# Patient Record
Sex: FEMALE | Race: WHITE | NOT HISPANIC OR LATINO | Employment: OTHER | URBAN - METROPOLITAN AREA
[De-identification: names, ages, dates, MRNs, and addresses within clinical notes are randomized per-mention and may not be internally consistent; named-entity substitution may affect disease eponyms.]

---

## 2017-07-20 ENCOUNTER — HOSPITAL ENCOUNTER (EMERGENCY)
Facility: HOSPITAL | Age: 57
Discharge: HOME | End: 2017-07-20
Attending: EMERGENCY MEDICINE | Admitting: EMERGENCY MEDICINE

## 2017-07-20 ENCOUNTER — APPOINTMENT (OUTPATIENT)
Dept: GENERAL RADIOLOGY | Facility: HOSPITAL | Age: 57
End: 2017-07-20

## 2017-07-20 ENCOUNTER — APPOINTMENT (OUTPATIENT)
Dept: MRI IMAGING | Facility: HOSPITAL | Age: 57
End: 2017-07-20

## 2017-07-20 VITALS
OXYGEN SATURATION: 96 % | HEART RATE: 85 BPM | RESPIRATION RATE: 18 BRPM | WEIGHT: 149 LBS | TEMPERATURE: 98.1 F | BODY MASS INDEX: 22.58 KG/M2 | DIASTOLIC BLOOD PRESSURE: 78 MMHG | HEIGHT: 68 IN | SYSTOLIC BLOOD PRESSURE: 125 MMHG

## 2017-07-20 DIAGNOSIS — S80.02XA CONTUSION OF LEFT KNEE, INITIAL ENCOUNTER: Primary | ICD-10-CM

## 2017-07-20 DIAGNOSIS — S33.5XXA LUMBAR SPRAIN, INITIAL ENCOUNTER: ICD-10-CM

## 2017-07-20 DIAGNOSIS — M54.50 ACUTE BILATERAL LOW BACK PAIN WITHOUT SCIATICA: ICD-10-CM

## 2017-07-20 PROCEDURE — 96372 THER/PROPH/DIAG INJ SC/IM: CPT

## 2017-07-20 PROCEDURE — 25010000002 KETOROLAC TROMETHAMINE PER 15 MG: Performed by: PHYSICIAN ASSISTANT

## 2017-07-20 PROCEDURE — 99284 EMERGENCY DEPT VISIT MOD MDM: CPT

## 2017-07-20 PROCEDURE — 73560 X-RAY EXAM OF KNEE 1 OR 2: CPT

## 2017-07-20 PROCEDURE — 72148 MRI LUMBAR SPINE W/O DYE: CPT

## 2017-07-20 RX ORDER — OXYCODONE AND ACETAMINOPHEN 7.5; 325 MG/1; MG/1
1 TABLET ORAL ONCE
Status: COMPLETED | OUTPATIENT
Start: 2017-07-20 | End: 2017-07-20

## 2017-07-20 RX ORDER — ONDANSETRON 4 MG/1
4 TABLET, ORALLY DISINTEGRATING ORAL ONCE
Status: COMPLETED | OUTPATIENT
Start: 2017-07-20 | End: 2017-07-20

## 2017-07-20 RX ORDER — KETOROLAC TROMETHAMINE 30 MG/ML
30 INJECTION, SOLUTION INTRAMUSCULAR; INTRAVENOUS ONCE
Status: COMPLETED | OUTPATIENT
Start: 2017-07-20 | End: 2017-07-20

## 2017-07-20 RX ORDER — HYDROCODONE BITARTRATE AND ACETAMINOPHEN 5; 325 MG/1; MG/1
1 TABLET ORAL EVERY 6 HOURS PRN
Qty: 15 TABLET | Refills: 0 | Status: SHIPPED | OUTPATIENT
Start: 2017-07-20

## 2017-07-20 RX ORDER — CYCLOBENZAPRINE HCL 10 MG
10 TABLET ORAL 3 TIMES DAILY PRN
Qty: 15 TABLET | Refills: 0 | Status: SHIPPED | OUTPATIENT
Start: 2017-07-20

## 2017-07-20 RX ADMIN — OXYCODONE HYDROCHLORIDE AND ACETAMINOPHEN 1 TABLET: 7.5; 325 TABLET ORAL at 20:09

## 2017-07-20 RX ADMIN — ONDANSETRON 4 MG: 4 TABLET, ORALLY DISINTEGRATING ORAL at 20:30

## 2017-07-20 RX ADMIN — KETOROLAC TROMETHAMINE 30 MG: 30 INJECTION, SOLUTION INTRAMUSCULAR at 22:47

## 2017-07-20 NOTE — ED PROVIDER NOTES
Subjective   HPI Comments: Ms. Brisa Rivera is a 56 y.o. female who presents to the ED with c/o a fall. She reports that last yesterday evening her knee gave out as she was going up some steps in front of her house and she fell backwards, landing on the concrete sidewalk. She now complains of back pain that radiates into her groin, neck pain, and left knee pain. She also reports that she had an episode of bowel incontinence 4 hours ago and has not urinated all day. She has a hx of right knee replacement. No other acute complaints at this time.      Patient is a 56 y.o. female presenting with fall.   History provided by:  Patient  Fall   Mechanism of injury: fall    Injury location:  Torso, head/neck and leg  Head/neck injury location:  L neck and R neck  Torso injury location:  Back  Leg injury location:  L knee  Time since incident:  1 day  Arrived directly from scene: no    Fall:     Impact surface:  [place]    Entrapped after fall: no    Protective equipment: none    Prior to arrival data:     Patient ambulatory at scene: yes      Loss of consciousness: no    Associated symptoms: back pain and neck pain    Associated symptoms: no difficulty breathing, no loss of consciousness and no vomiting        Review of Systems   Constitutional: Negative for chills and fever.   Gastrointestinal: Negative for vomiting.   Musculoskeletal: Positive for back pain and neck pain.   Neurological: Negative for loss of consciousness and syncope.   All other systems reviewed and are negative.      Past Medical History:   Diagnosis Date   • Bipolar 1 disorder    • GERD (gastroesophageal reflux disease)    • Myocardial infarction    • Pneumonia        Allergies   Allergen Reactions   • Contrast Dye    • Demerol [Meperidine]    • Morphine And Related        Past Surgical History:   Procedure Laterality Date   • APPENDECTOMY     • HERNIA REPAIR     • HYSTERECTOMY     • KNEE SURGERY     • TONSILLECTOMY         History reviewed. No  pertinent family history.    Social History     Social History   • Marital status:      Spouse name: N/A   • Number of children: N/A   • Years of education: N/A     Social History Main Topics   • Smoking status: Never Smoker   • Smokeless tobacco: None   • Alcohol use No   • Drug use: No   • Sexual activity: Defer     Other Topics Concern   • None     Social History Narrative   • None         Objective   Physical Exam   Constitutional: She is oriented to person, place, and time. She appears well-developed and well-nourished. No distress.   HENT:   Head: Normocephalic and atraumatic.   Mouth/Throat: Oropharynx is clear and moist and mucous membranes are normal.   Eyes: Pupils are equal, round, and reactive to light.   Neck: Normal range of motion.   Cardiovascular: Normal rate, regular rhythm and normal heart sounds.    No murmur heard.  Pulmonary/Chest: Effort normal and breath sounds normal. No respiratory distress. She has no wheezes. She has no rales.   Abdominal: Soft. Bowel sounds are normal. There is no tenderness.   Genitourinary: Rectal exam shows anal tone normal.   Musculoskeletal: Normal range of motion. She exhibits tenderness. She exhibits no edema.   Patient is tender to light palpation of her left knee and left medial lower leg. No obvious crepitus or step off with normal ROM. Her back is TTP to light palpitation.   Neurological: She is alert and oriented to person, place, and time.   Skin: Skin is warm and dry. Bruising (Bruising to lef knee and left medial lower leg) noted.   Psychiatric: She has a normal mood and affect. Her behavior is normal.   Nursing note and vitals reviewed.      Procedures        No results found for this or any previous visit (from the past 24 hour(s)).  Note: In addition to lab results from this visit, the labs listed above may include labs taken at another facility or during a different encounter within the last 24 hours. Please correlate lab times with ED admission  "and discharge times for further clarification of the services performed during this visit.    MRI Lumbar Spine Without Contrast   ED Interpretation   1.  No evidence of acute fracture or subluxation.   2.  Multilevel spondylosis and lumbar degenerative changes as described in    detail above.   3.  Mild lumbar dextroscoliosis.        THIS DOCUMENT HAS BEEN ELECTRONICALLY SIGNED BY JOHAN SCOTT JR. MD      Final Result   Abnormal   1.  No evidence of acute fracture or subluxation.   2.  Multilevel spondylosis and lumbar degenerative changes as described in    detail above.   3.  Mild lumbar dextroscoliosis.        THIS DOCUMENT HAS BEEN ELECTRONICALLY SIGNED BY JOHAN SCOTT JR. MD      XR Knee 1 or 2 View Left   Final Result   Abnormal   No acute findings.      THIS DOCUMENT HAS BEEN ELECTRONICALLY SIGNED BY FERNY LONGORIA MD        Vitals:    07/20/17 1721 07/20/17 1829 07/20/17 2231 07/20/17 2233   BP: 121/62  126/81    BP Location: Left arm Left arm     Patient Position: Sitting Sitting     Pulse: 83 85     Resp: 18 22     Temp: 97.9 °F (36.6 °C)      TempSrc: Oral      SpO2: 100% 99%  97%   Weight: 149 lb (67.6 kg)      Height: 68\" (172.7 cm)        Medications   oxyCODONE-acetaminophen (PERCOCET) 7.5-325 MG per tablet 1 tablet (1 tablet Oral Given 7/20/17 2009)   ondansetron ODT (ZOFRAN-ODT) disintegrating tablet 4 mg (4 mg Oral Given 7/20/17 2030)   ketorolac (TORADOL) injection 30 mg (30 mg Intramuscular Given 7/20/17 2247)     ECG/EMG Results (last 24 hours)     ** No results found for the last 24 hours. **          ED Course  ED Course   Comment By Time   IMPRESSION:  1.  No evidence of acute fracture or subluxation.  2.  Multilevel spondylosis and lumbar degenerative changes as described in   detail above.  3.  Mild lumbar dextroscoliosis. Tobi Casas PA-C 07/20 2300                     [facility]    Final diagnoses:   Contusion of left knee, initial encounter   Acute bilateral low back pain without sciatica "   Lumbar sprain, initial encounter       Documentation assistance provided by shreya Melendez.  Information recorded by the scribe was done at my direction and has been verified and validated by me.     Sonia Melendez  07/20/17 2012       Tobi Casas PA-C  07/20/17 1596

## 2017-07-21 NOTE — DISCHARGE INSTRUCTIONS
Apply warm compresses sparingly to sore areas for the next 24 hours.  Rest, follow-up with your primary care provider for recheck in 1-2 days.  If you have no primary care provider, choose from one of the providers listed below to establish primary care provider and recheck in 1-2 days.  Return to emergency department if any change or worsening of symptoms.

## 2017-07-23 ENCOUNTER — HOSPITAL ENCOUNTER (EMERGENCY)
Facility: HOSPITAL | Age: 57
Discharge: HOME | End: 2017-07-23
Attending: EMERGENCY MEDICINE | Admitting: EMERGENCY MEDICINE

## 2017-07-23 VITALS
RESPIRATION RATE: 16 BRPM | DIASTOLIC BLOOD PRESSURE: 76 MMHG | HEIGHT: 68 IN | WEIGHT: 149 LBS | HEART RATE: 82 BPM | OXYGEN SATURATION: 99 % | SYSTOLIC BLOOD PRESSURE: 122 MMHG | TEMPERATURE: 98 F | BODY MASS INDEX: 22.58 KG/M2

## 2017-07-23 DIAGNOSIS — S30.0XXA LUMBAR CONTUSION, INITIAL ENCOUNTER: Primary | ICD-10-CM

## 2017-07-23 PROCEDURE — 99283 EMERGENCY DEPT VISIT LOW MDM: CPT

## 2017-07-23 RX ORDER — PREDNISONE 20 MG/1
20 TABLET ORAL
Qty: 15 TABLET | Refills: 0 | Status: SHIPPED | OUTPATIENT
Start: 2017-07-23 | End: 2017-07-28

## 2017-07-23 NOTE — ED PROVIDER NOTES
Subjective   HPI Comments: 56-year-old white female returns to [facility] department complaining of continued low back pain.  Patient states that she fell off of a 1 foot stoop onto her back on 7/20/2017 .  At that time she had severe pain and actually had loss of bowel control.  She is evaluated in our emergency department and had essentially a negative MRI of her lumbar spine.  Since that time, she continues to have pain in the low back.  She denies any loss of bowel or bladder control, inability to urinate, and ability to walk, or numbness in the saddle region.  She has no other complaints and denies any new injury.    Patient is a 56 y.o. female presenting with back pain.   History provided by:  Patient  Back Pain   Location:  Lumbar spine  Radiates to:  R thigh  Pain severity:  Moderate  Onset quality:  Gradual  Timing:  Constant  Chronicity:  New  Context: falling    Relieved by:  Nothing  Worsened by:  Bowel movement  Ineffective treatments:  None tried  Associated symptoms: no abdominal pain, no abdominal swelling, no bladder incontinence, no bowel incontinence, no dysuria, no fever, no numbness, no pelvic pain, no perianal numbness and no weakness        Review of Systems   Constitutional: Negative for fever.   Gastrointestinal: Negative for abdominal pain and bowel incontinence.   Genitourinary: Negative for bladder incontinence, dysuria and pelvic pain.   Musculoskeletal: Positive for back pain.   Neurological: Negative for weakness and numbness.   All other systems reviewed and are negative.      Past Medical History:   Diagnosis Date   • Bipolar 1 disorder    • GERD (gastroesophageal reflux disease)    • Myocardial infarction    • Pneumonia        Allergies   Allergen Reactions   • Contrast Dye    • Demerol [Meperidine]    • Morphine And Related        Past Surgical History:   Procedure Laterality Date   • APPENDECTOMY     • HERNIA REPAIR     • HYSTERECTOMY     • KNEE SURGERY     • TONSILLECTOMY          History reviewed. No pertinent family history.    Social History     Social History   • Marital status:      Spouse name: N/A   • Number of children: N/A   • Years of education: N/A     Social History Main Topics   • Smoking status: Never Smoker   • Smokeless tobacco: None   • Alcohol use No   • Drug use: No   • Sexual activity: Defer     Other Topics Concern   • None     Social History Narrative           Objective   Physical Exam   Constitutional: She appears well-developed and well-nourished.   HENT:   Head: Normocephalic and atraumatic.   Eyes: Conjunctivae are normal.   Cardiovascular: Normal rate, regular rhythm and normal heart sounds.    No murmur heard.  Pulmonary/Chest: Effort normal and breath sounds normal. No respiratory distress.   Abdominal: Soft. Bowel sounds are normal. There is no tenderness.   Musculoskeletal: Normal range of motion.   Tender lumbar spine L2 to S1.   Neurological: She is alert. She has normal reflexes. She displays normal reflexes.   Skin: Skin is warm and dry. No rash noted.   Psychiatric: She has a normal mood and affect. Her behavior is normal. Judgment and thought content normal.   Nursing note and vitals reviewed.      Procedures         ED Course  ED Course                  MDM    Final diagnoses:   Lumbar contusion, initial encounter            NICOLASA Colvin  07/23/17 0850

## 2017-07-23 NOTE — DISCHARGE INSTRUCTIONS
Return of loss of bowel or bladder control, numbness to perineal area, or inability to walk.  Follow up with one of the physician centers below to setup primary care.    [facility], [phone], [address], [address], [place] [address]    [facility], [phone], [address], [phone], [address] [place] [address] [address] [place] [id number];     [facility], [phone], [address] [place] [address]